# Patient Record
Sex: FEMALE | Race: WHITE | NOT HISPANIC OR LATINO | URBAN - METROPOLITAN AREA
[De-identification: names, ages, dates, MRNs, and addresses within clinical notes are randomized per-mention and may not be internally consistent; named-entity substitution may affect disease eponyms.]

---

## 2024-02-03 ASSESSMENT — DERMATOLOGY QUALITY OF LIFE (QOL) ASSESSMENT
RATE HOW EMOTIONALLY BOTHERED YOU ARE BY YOUR SKIN PROBLEM (FOR EXAMPLE, WORRY, EMBARRASSMENT, FRUSTRATION): 0 - NEVER BOTHERED
RATE HOW BOTHERED YOU ARE BY EFFECTS OF YOUR SKIN PROBLEMS ON YOUR ACTIVITIES (EG, GOING OUT, ACCOMPLISHING WHAT YOU WANT, WORK ACTIVITIES OR YOUR RELATIONSHIPS WITH OTHERS): 0 - NEVER BOTHERED
RATE HOW BOTHERED YOU ARE BY SYMPTOMS OF YOUR SKIN PROBLEM (EG, ITCHING, STINGING BURNING, HURTING OR SKIN IRRITATION): 1
RATE HOW BOTHERED YOU ARE BY SYMPTOMS OF YOUR SKIN PROBLEM (EG, ITCHING, STINGING BURNING, HURTING OR SKIN IRRITATION): 1
RATE HOW EMOTIONALLY BOTHERED YOU ARE BY YOUR SKIN PROBLEM (FOR EXAMPLE, WORRY, EMBARRASSMENT, FRUSTRATION): 0 - NEVER BOTHERED
RATE HOW BOTHERED YOU ARE BY EFFECTS OF YOUR SKIN PROBLEMS ON YOUR ACTIVITIES (EG, GOING OUT, ACCOMPLISHING WHAT YOU WANT, WORK ACTIVITIES OR YOUR RELATIONSHIPS WITH OTHERS): 0 - NEVER BOTHERED

## 2024-02-03 ASSESSMENT — PATIENT GLOBAL ASSESSMENT (PGA): WHAT IS THE PGA: PATIENT GLOBAL ASSESSMENT:  1 - CLEAR

## 2024-02-07 ENCOUNTER — OFFICE VISIT (OUTPATIENT)
Dept: DERMATOLOGY | Facility: CLINIC | Age: 74
End: 2024-02-07
Payer: MEDICARE

## 2024-02-07 DIAGNOSIS — L56.8 OTHER SPECIFIED ACUTE SKIN CHANGES DUE TO ULTRAVIOLET RADIATION: ICD-10-CM

## 2024-02-07 DIAGNOSIS — L57.0 ACTINIC KERATOSIS: ICD-10-CM

## 2024-02-07 DIAGNOSIS — Z12.83 SKIN CANCER SCREENING: ICD-10-CM

## 2024-02-07 DIAGNOSIS — D48.5 NEOPLASM OF UNCERTAIN BEHAVIOR OF SKIN: Primary | ICD-10-CM

## 2024-02-07 DIAGNOSIS — L82.1 SEBORRHEIC KERATOSIS: ICD-10-CM

## 2024-02-07 PROCEDURE — 99213 OFFICE O/P EST LOW 20 MIN: CPT | Performed by: DERMATOLOGY

## 2024-02-07 PROCEDURE — 88305 TISSUE EXAM BY PATHOLOGIST: CPT | Performed by: DERMATOLOGY

## 2024-02-07 PROCEDURE — 17000 DESTRUCT PREMALG LESION: CPT | Performed by: STUDENT IN AN ORGANIZED HEALTH CARE EDUCATION/TRAINING PROGRAM

## 2024-02-07 PROCEDURE — 11102 TANGNTL BX SKIN SINGLE LES: CPT | Performed by: STUDENT IN AN ORGANIZED HEALTH CARE EDUCATION/TRAINING PROGRAM

## 2024-02-07 PROCEDURE — 1159F MED LIST DOCD IN RCRD: CPT | Performed by: DERMATOLOGY

## 2024-02-07 PROCEDURE — 17003 DESTRUCT PREMALG LES 2-14: CPT | Performed by: STUDENT IN AN ORGANIZED HEALTH CARE EDUCATION/TRAINING PROGRAM

## 2024-02-07 ASSESSMENT — DERMATOLOGY PATIENT ASSESSMENT
ARE YOU AN ORGAN TRANSPLANT RECIPIENT: NO
HAVE YOU HAD OR DO YOU HAVE VASCULAR DISEASE: NO
DO YOU HAVE ANY NEW OR CHANGING LESIONS: NO
DO YOU USE A TANNING BED: NO
DO YOU USE SUNSCREEN: DAILY
HAVE YOU HAD OR DO YOU HAVE A STAPH INFECTION: NO
DO YOU HAVE IRREGULAR MENSTRUAL CYCLES: NO
ARE YOU TRYING TO GET PREGNANT: NO
ARE YOU ON BIRTH CONTROL: NO

## 2024-02-07 ASSESSMENT — DERMATOLOGY QUALITY OF LIFE (QOL) ASSESSMENT
ARE THERE EXCLUSIONS OR EXCEPTIONS FOR THE QUALITY OF LIFE ASSESSMENT: NO
RATE HOW EMOTIONALLY BOTHERED YOU ARE BY YOUR SKIN PROBLEM (FOR EXAMPLE, WORRY, EMBARRASSMENT, FRUSTRATION): 0 - NEVER BOTHERED
RATE HOW BOTHERED YOU ARE BY EFFECTS OF YOUR SKIN PROBLEMS ON YOUR ACTIVITIES (EG, GOING OUT, ACCOMPLISHING WHAT YOU WANT, WORK ACTIVITIES OR YOUR RELATIONSHIPS WITH OTHERS): 0 - NEVER BOTHERED
DATE THE QUALITY-OF-LIFE ASSESSMENT WAS COMPLETED: 66877
RATE HOW BOTHERED YOU ARE BY SYMPTOMS OF YOUR SKIN PROBLEM (EG, ITCHING, STINGING BURNING, HURTING OR SKIN IRRITATION): 0 - NEVER BOTHERED

## 2024-02-07 ASSESSMENT — PATIENT GLOBAL ASSESSMENT (PGA): PATIENT GLOBAL ASSESSMENT: PATIENT GLOBAL ASSESSMENT:  1 - CLEAR

## 2024-02-07 ASSESSMENT — ITCH NUMERIC RATING SCALE: HOW SEVERE IS YOUR ITCHING?: 0

## 2024-02-07 NOTE — PROGRESS NOTES
Subjective     Karli Gentile is a 73 y.o. female who presents for the following: No chief complaint on file..     She has a hx of BCC of the left cheek and left shoulder in 2018.    Review of Systems:  No other skin or systemic complaints other than what is documented elsewhere in the note.    The following portions of the chart were reviewed this encounter and updated as appropriate:          Skin Cancer History  No skin cancer on file.      Specialty Problems    None       Objective   Well appearing patient in no apparent distress; mood and affect are within normal limits.    A focused skin examination was performed. All findings within normal limits unless otherwise noted below.    Stuck on verrucous, tan-brown papules and plaques.      Diffuse photodamage with actinic changes with telangiectasia and mottled pigmentation in sun-exposed areas.     The rest of the skin exam at today's visit was notable for: scattered clinically benign-appearing: Nevi of scalp.  Nevi of neck.  Nevi on the face.  Nevi on the upper extremities.  Nevi of trunk.  Nevi on the lower extremities.   lentigines.  seborrhoeic keratoses. cherry angiomas. dermatofibromas. sebaceous hyperplasia. skin tags. Well healed scar at the site(s) of prior treatment with no evidence of recurrence.       Mid upper forehead  Erythematous macule with central scale     Left Buccal Cheek, Right Mid Helix  Erythematous macules with gritty scale.           Assessment/Plan   Neoplasm of uncertain behavior of skin  Mid upper forehead    Lesion biopsy  Type of biopsy: tangential    Informed consent: discussed and consent obtained    Timeout: patient name, date of birth, surgical site, and procedure verified    Procedure prep:  Patient was prepped and draped  Anesthesia: the lesion was anesthetized in a standard fashion    Anesthetic:  1% lidocaine w/ epinephrine 1-100,000 local infiltration  Instrument used: DermaBlade    Hemostasis achieved with: aluminum  chloride    Outcome: patient tolerated procedure well    Post-procedure details: sterile dressing applied and wound care instructions given    Dressing type: petrolatum and bandage      Specimen 1 - Dermatopathology- DERM LAB  Differential Diagnosis: r/o AK vs SCC vs BCC  Check Margins Yes/No?:    Comments:    Dermpath Lab: Routine Histopathology (formalin-fixed tissue)    Lesion concerning for AK vs BCC vs SCC.   - Previously treated with cryotherapy and adjacent areas were also treated with cryotherapy with no resolution.     The need for biopsy for definitive diagnosis recommended. Risks and benefits reviewed, see procedure note.      Actinic keratosis (2)  Right Mid Helix; Left Buccal Cheek    Actinic Keratoses - right helix, right and left lower cheek.  The pre-cancerous nature of these lesions and treatment options were discussed with the patient today.  At this time, I recommend treatment with liquid nitrogen cryotherapy.  The patient expressed understanding, is in agreement with this plan, and wishes to proceed with cryotherapy today.     Destr of lesion - Left Buccal Cheek, Right Mid Helix  Complexity: simple    Destruction method: cryotherapy    Informed consent: discussed and consent obtained    Lesion destroyed using liquid nitrogen: Yes    Cryotherapy cycles:  1  Outcome: patient tolerated procedure well with no complications    Post-procedure details: wound care instructions given      Seborrheic keratosis    Seborrheic Keratoses - the benign nature of these lesions was discussed with the patient today and reassurance provided.  No treatment is medically indicated for these lesions at this time.     Other specified acute skin changes due to ultraviolet radiation    Photodamage.  The signs and symptoms of skin cancer were reviewed and the patient was advised to practice sun protection and sun avoidance, use daily sunscreen, and perform regular self skin exams.  Sun protection was discussed, including  avoiding the mid-day sun, wearing a sunscreen with SPF at least 50, and stressing the need for reapplication of sunscreen and applying more than they think they need.     Skin cancer screening    Total body skin exam  - No lesions concerning for malignancy on today’s exam  - ABCDEs of melanoma discussed. Ugly duckling sign discussed. Monitor for any skin lesions that are different in color, shape, or size than others on the body.  - Sun protection emphasized. Recommend SPF 30+, hats with brims, sun protective clothing, and avoiding sun exposure between 10am and 2 pm whenever possible  - Recommend regular skin exams or sooner if new or changing lesions  - Return for skin check in  1 year    Related Procedures  Follow Up In Dermatology - Established Patient      My Kasey, DO  Department of Dermatology    I saw and evaluated the patient. I personally obtained the key and critical portions of the history and physical exam or was physically present for key and critical portions performed by the resident/fellow. I reviewed the resident/fellow's documentation and discussed the patient with the resident/fellow. I agree with the resident/fellow's medical decision making as documented in the note.    Bhargav Glez MD PhD

## 2024-02-09 LAB
LABORATORY COMMENT REPORT: NORMAL
PATH REPORT.FINAL DX SPEC: NORMAL
PATH REPORT.GROSS SPEC: NORMAL
PATH REPORT.MICROSCOPIC SPEC OTHER STN: NORMAL
PATH REPORT.RELEVANT HX SPEC: NORMAL
PATH REPORT.TOTAL CANCER: NORMAL

## 2024-02-12 ENCOUNTER — TELEPHONE (OUTPATIENT)
Dept: DERMATOLOGY | Facility: CLINIC | Age: 74
End: 2024-02-12
Payer: MEDICARE

## 2024-02-13 NOTE — TELEPHONE ENCOUNTER
TELEPHONE ENCOUNTER  2/12/2024  Karli Gentile  [phone]    Reason for Call:  -Patient called stating her biopsy site started to have redness and swelling around it. Denied drainage or pain. She noticed the area affected was specific to to where the adhesive of the bandage was placed. She applied fluocinonide ointment she had at home to the area today due to the itch and swelling.     Patient advised:  - Discussed with patient after reviewing the photos that this was most likely a reaction to the adhesive. Instructed to discontinue the fluocinonide, and switch to the hydrocortisone 1% cream she already has at home. Discussed she may continue to pply vaseline to the biopsy site and may use telfa and paper tape if she feels she needs to cover the area. Patient expressed understanding.     Juanita Victor, DO

## 2024-03-25 ENCOUNTER — APPOINTMENT (OUTPATIENT)
Dept: DERMATOLOGY | Facility: CLINIC | Age: 74
End: 2024-03-25
Payer: MEDICARE

## 2024-03-27 ENCOUNTER — OFFICE VISIT (OUTPATIENT)
Dept: DERMATOLOGY | Facility: CLINIC | Age: 74
End: 2024-03-27
Payer: MEDICARE

## 2024-03-27 DIAGNOSIS — L01.00 IMPETIGO: Primary | ICD-10-CM

## 2024-03-27 DIAGNOSIS — L82.1 SEBORRHEIC KERATOSIS: ICD-10-CM

## 2024-03-27 DIAGNOSIS — L57.8 DIFFUSE PHOTODAMAGE OF SKIN: ICD-10-CM

## 2024-03-27 DIAGNOSIS — L81.4 LENTIGO: ICD-10-CM

## 2024-03-27 DIAGNOSIS — L57.0 ACTINIC KERATOSIS: ICD-10-CM

## 2024-03-27 DIAGNOSIS — Z12.83 SKIN CANCER SCREENING: ICD-10-CM

## 2024-03-27 PROCEDURE — 17004 DESTROY PREMAL LESIONS 15/>: CPT | Performed by: DERMATOLOGY

## 2024-03-27 PROCEDURE — 99214 OFFICE O/P EST MOD 30 MIN: CPT | Performed by: DERMATOLOGY

## 2024-03-27 PROCEDURE — 1159F MED LIST DOCD IN RCRD: CPT | Performed by: DERMATOLOGY

## 2024-03-27 RX ORDER — MUPIROCIN 20 MG/G
OINTMENT TOPICAL
Qty: 22 G | Refills: 2 | Status: SHIPPED | OUTPATIENT
Start: 2024-03-27

## 2024-03-27 RX ORDER — LORATADINE 10 MG/1
1 TABLET ORAL AS NEEDED
COMMUNITY
Start: 2020-12-04

## 2024-03-27 RX ORDER — OLOPATADINE HYDROCHLORIDE 665 UG/1
SPRAY NASAL
COMMUNITY
Start: 2022-04-22

## 2024-03-27 RX ORDER — ACYCLOVIR 400 MG/1
TABLET ORAL
COMMUNITY
Start: 2023-11-06

## 2024-03-27 RX ORDER — DENOSUMAB 60 MG/ML
INJECTION SUBCUTANEOUS
COMMUNITY
Start: 2022-12-07

## 2024-03-27 RX ORDER — FAMOTIDINE 10 MG/1
TABLET ORAL
COMMUNITY

## 2024-03-27 RX ORDER — ACETAMINOPHEN 500 MG
TABLET ORAL
COMMUNITY

## 2024-03-27 RX ORDER — AZELASTINE 1 MG/ML
SPRAY, METERED NASAL
COMMUNITY

## 2024-03-27 NOTE — PROGRESS NOTES
Subjective     Karli Gentile is a 74 y.o. female who presents for the following: Rash (In left nostril).  The patient states she intermittently develops redness, scaling, and scabbing inside her nose every year.  She states she typically uses either Vaseline or Aquaphor ointment and the lesion heals, but her most recent flare began 2 weeks ago and has not healed.  She denies any other areas of involvement.    Of note, the patient recently saw Dr. Glez in our office on 2/7/24 for a total body skin exam, at which time biopsy of a suspicious lesion on her mid upper forehead revealed an actinic keratosis.  Today, the patient states the biopsy site healed well.  She denies any other new, changing, or concerning skin lesions since that visit; no bleeding, itching, or burning lesions.      Review of Systems:  No other skin or systemic complaints other than what is documented elsewhere in the note.    The following portions of the chart were reviewed this encounter and updated as appropriate:       Skin Cancer History  No skin cancer on file.    Specialty Problems    None      Past Dermatologic / Past Relevant Medical History:    - reported history of 2 basal cell carcinomas, including on left cheek s/p Mohs surgery by Dr. Pettit in the past and on left shoulder, according to the patient  - AKs  - no h/o melanoma    Family History:    No family history of melanoma    Social History:    The patient states she works as the  of a [employer] in [place] and lives in [place], and her  will be undergoing neurosurgery for a pinched nerve soon    Allergies:  Benzodiazepines, Fentanyl, Quinolones, Cat dander, and Weed pollen    Current Medications / CAM's:    Current Outpatient Medications:     acyclovir (Zovirax) 400 mg tablet, , Disp: , Rfl:     denosumab (Prolia) 60 mg/mL syringe, by Does not apply route., Disp: , Rfl:     loratadine (Claritin) 10 mg tablet, Take 1 tablet (10 mg)  by mouth if needed., Disp: , Rfl:     olopatadine (Patanase) 0.6 % spray,non-aerosol nasal spray, , Disp: , Rfl:     azelastine (Astelin) 137 mcg (0.1 %) nasal spray, , Disp: , Rfl:     cholecalciferol (Vitamin D-3) 5,000 Units tablet, Take by mouth., Disp: , Rfl:     famotidine (Pepcid) 10 mg tablet, Take by mouth., Disp: , Rfl:     mupirocin (Bactroban) 2 % ointment, Apply twice daily to affected area of nose for 2 weeks; repeat as needed for flares, Disp: 22 g, Rfl: 2     Objective   Well appearing patient in no apparent distress; mood and affect are within normal limits.    A skin examination was performed including the face and neck. All findings within normal limits unless otherwise noted below.    Assessment/Plan   1. Impetigo  Left Tip of Nose  On the medial aspect of her left nasal alar rim, there is an erythematous, scaly and crusted papule with a central fissure and overlying yellowish, honey colored crust    Impetigo - left nasal alar rim.  The bacterial nature of this condition and treatment options were discussed extensively with the patient in the office today.  At this time, I recommend topical antibiotic therapy with Mupirocin 2% ointment, which she was instructed to apply twice daily to the affected area of her nose for the next 2 weeks; she may repeat treatment in a similar fashion as needed for future flares.  I also recommend frequent and aggressive moisturization of the area several times a day with either Vaseline or Aquaphor ointment.  The risks, benefits, and side effects of these medications were discussed.  She expressed understanding and is in agreement with this plan.    mupirocin (Bactroban) 2 % ointment - Left Tip of Nose  Apply twice daily to affected area of nose for 2 weeks; repeat as needed for flares    2. Skin cancer screening    Related Procedures  Follow Up In Dermatology - Established Patient    3. Actinic keratosis (16)  Mid Forehead (16)  On the patient's mid upper forehead,  there is a pink, well-healed scar at the recent biopsy site with a surrounding rim of erythema, grittiness, and scale; scattered on the patient's face, including her forehead, nose, and bilateral cheeks, there are multiple erythematous, gritty, scaly macules    Biopsy-proven Actinic Keratosis and additional AKs -mid upper forehead, present on the deep and peripheral margins, and scattered on face, respectively.  The pre-cancerous nature of these lesions and treatment options were discussed with the patient today.  At this time, I recommend treatment with liquid nitrogen cryotherapy.  The patient expressed understanding, is in agreement with this plan, and wishes to proceed with cryotherapy today.    Destr of lesion - Mid Forehead  Complexity: simple    Destruction method: cryotherapy    Informed consent: discussed and consent obtained    Lesion destroyed using liquid nitrogen: Yes    Cryotherapy cycles:  1  Outcome: patient tolerated procedure well with no complications    Post-procedure details: wound care instructions given      4. Seborrheic keratosis  Scattered on the patient's face and neck, there are multiple tan- to light brown-colored, hyperkeratotic, stuck-on appearing papules of varying size and shape    Seborrheic Keratoses - the benign nature of these lesions was discussed with the patient today and reassurance provided.  No treatment is medically indicated for these lesions at this time.    5. Lentigo  Photodistributed  Multiple tan- to light brown-colored, round- to oval-shaped, symmetric and uniform-appearing macules and small patches consistent with lentigines scattered in sun-exposed areas.    Solar Lentigines and photodamage.  The clinically benign-appearing nature of these lesions and their relation to chronic sun exposure were discussed with the patient today and reassurance provided.  None of these lesions meet threshold for biopsy today, and thus no treatment is medically indicated for these  lesions at this time.  The signs and symptoms of skin cancer were reviewed and the patient was advised to practice sun protection and sun avoidance, use daily sunscreen, and perform regular self skin exams.  The patient was instructed to monitor these lesions for any changes, such as in size, shape, or color, or associated symptoms and to call our office to schedule a return visit for re-evaluation if any such changes or symptoms are noticed in the future.  The patient expressed understanding and is in agreement with this plan.    6. Diffuse photodamage of skin  Photodistributed  Diffuse photodamage with actinic changes with telangiectasia and mottled pigmentation in sun-exposed areas.    History of basal cell carcinomas and actinic keratoses and photodamage.  The patient was recently seen in our office for routine follow-up and skin exam by Dr. Glez on 2/7/24, at which time no evidence of recurrence was noted.  The signs and symptoms of skin cancer were reviewed and the patient was advised to practice sun protection and sun avoidance, use daily sunscreen, and perform regular self skin exams.  I recommend the patient return to our office in 6 to 12 months from the date of her last visit with Dr. Glez for routine follow-up and skin exam, and the patient was instructed to call our office should the patient notice any new, changing, symptomatic, or otherwise concerning skin lesions before then.  The patient expressed understanding and is in agreement with this plan.

## 2024-07-06 ENCOUNTER — HOSPITAL ENCOUNTER (EMERGENCY)
Facility: HOSPITAL | Age: 74
Discharge: HOME | End: 2024-07-07
Payer: MEDICARE

## 2024-07-06 ENCOUNTER — APPOINTMENT (OUTPATIENT)
Dept: RADIOLOGY | Facility: HOSPITAL | Age: 74
End: 2024-07-06
Payer: MEDICARE

## 2024-07-06 DIAGNOSIS — M25.572 ACUTE LEFT ANKLE PAIN: Primary | ICD-10-CM

## 2024-07-06 DIAGNOSIS — S96.912A STRAIN OF LEFT ANKLE, INITIAL ENCOUNTER: ICD-10-CM

## 2024-07-06 DIAGNOSIS — S93.402A SPRAIN OF LEFT ANKLE, INITIAL ENCOUNTER: ICD-10-CM

## 2024-07-06 PROCEDURE — 73610 X-RAY EXAM OF ANKLE: CPT | Mod: LEFT SIDE | Performed by: RADIOLOGY

## 2024-07-06 PROCEDURE — 73610 X-RAY EXAM OF ANKLE: CPT | Mod: LT

## 2024-07-06 PROCEDURE — 99283 EMERGENCY DEPT VISIT LOW MDM: CPT

## 2024-07-06 ASSESSMENT — COLUMBIA-SUICIDE SEVERITY RATING SCALE - C-SSRS
6. HAVE YOU EVER DONE ANYTHING, STARTED TO DO ANYTHING, OR PREPARED TO DO ANYTHING TO END YOUR LIFE?: NO
1. IN THE PAST MONTH, HAVE YOU WISHED YOU WERE DEAD OR WISHED YOU COULD GO TO SLEEP AND NOT WAKE UP?: NO
2. HAVE YOU ACTUALLY HAD ANY THOUGHTS OF KILLING YOURSELF?: NO

## 2024-07-06 ASSESSMENT — PAIN DESCRIPTION - ORIENTATION: ORIENTATION: LEFT

## 2024-07-06 ASSESSMENT — PAIN - FUNCTIONAL ASSESSMENT: PAIN_FUNCTIONAL_ASSESSMENT: 0-10

## 2024-07-06 ASSESSMENT — PAIN DESCRIPTION - LOCATION: LOCATION: ANKLE

## 2024-07-06 ASSESSMENT — PAIN DESCRIPTION - PAIN TYPE: TYPE: ACUTE PAIN

## 2024-07-06 ASSESSMENT — PAIN SCALES - GENERAL: PAINLEVEL_OUTOF10: 7

## 2024-07-07 VITALS
OXYGEN SATURATION: 98 % | BODY MASS INDEX: 21.49 KG/M2 | SYSTOLIC BLOOD PRESSURE: 136 MMHG | HEART RATE: 66 BPM | TEMPERATURE: 97.7 F | RESPIRATION RATE: 17 BRPM | HEIGHT: 65 IN | WEIGHT: 129 LBS | DIASTOLIC BLOOD PRESSURE: 78 MMHG

## 2024-07-07 NOTE — ED PROVIDER NOTES
Chief Complaint   Patient presents with    Ankle Pain     left     HPI:   Karli Gentile is an 74 y.o. female with a history of HTN presenting to the ED for chief complaint of left ankle pain.  She explains that her pain started today when she was at [employer].  She explains that she was walking up the hill to use the bathroom when she came back down to her seat she had increasingly worsening pain in the left ankle.  She denies twisting or falling.  She explains that she did momentarily lose her balance when she was walking on the heel but did not fall down.  States that she used 2 ibuprofen tablets about an hour ago which have slightly decreased her pain.  Her pain is worse with weightbearing and walking.  Slightly alleviated with rest.  Denies numbness or tingling.  Denies other injury.    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    Chondromalacia of patella    Essential (primary) hypertension 09/10/2014    Benign essential HTN    Pain in unspecified knee 11/25/2014    Knee pain    Personal history of diseases of the skin and subcutaneous tissue 11/24/2015    History of impetigo    Personal history of other infectious and parasitic diseases 07/19/2016    History of hepatitis    Urinary tract infection, site not specified 01/24/2014    Acute urinary tract infection     Past Surgical History:   Procedure Laterality Date    COLONOSCOPY  02/12/2016    Complete Colonoscopy    DILATION AND CURETTAGE OF UTERUS  02/12/2016    Dilation And Curettage Of Cervical Stump    TONSILLECTOMY  02/12/2016    Tonsillectomy With Adenoidectomy     No family history on file.     Physical Exam  Vitals and nursing note reviewed.   Constitutional:        General: She is not in acute distress.     Appearance: She is well-developed.   HENT:      Head: Normocephalic and atraumatic.   Eyes:      Conjunctiva/sclera: Conjunctivae normal.   Cardiovascular:      Rate and Rhythm: Normal rate and regular rhythm.      Heart sounds: No murmur heard.  Pulmonary:      Effort: Pulmonary effort is normal. No respiratory distress.      Breath sounds: Normal breath sounds.   Abdominal:      Palpations: Abdomen is soft.      Tenderness: There is no abdominal tenderness.   Musculoskeletal:         General: No swelling.      Cervical back: Neck supple.      Comments: Exam of the left ankle shows no obvious bruising, deformity or swelling.  She is directly tender over the ATFL.  She is also directly tender over the peroneal tendon.  No other areas of tenderness.  She does have full range of motion of the ankle.  Increased pain with plantarflexion and lateral movement.  Open drawer is equivocal   Skin:     General: Skin is warm and dry.      Capillary Refill: Capillary refill takes less than 2 seconds. Good distal pulses  Neurological:      General: No focal deficit present.      Mental Status: She is alert.      Sensory: No sensory deficit.      Comments: Neurovascularly intact   Psychiatric:         Mood and Affect: Mood normal.        VS: As documented in the triage note and EMR flowsheet from this visit were reviewed.    Medical Decision Making: This is a 74-year-old female presenting with left ankle pain that started today after she lost her balance at a concert.  Her pain is over the lateral aspect of the ankle.  She denies hearing a pop or having a fall.  On exam the patient's vitals are stable she is speaking full sentences in no acute distress.  There is no obvious deformity or swelling of the ankle.  She is directly tender over the ATFL.  Negative open drawer.  Suspicion for possible low-grade sprain of the ATFL.  She is also directly tender over the peroneal tendon.  Concern  for strain of the tendon.  She has full strength so low suspicion for complete tear.  She is neurovascularly intact good distal pulses compartments of the leg are soft.  X-ray of the ankle showed no acute abnormality.  Patient was reassured by these results today.  Discussed with her using a walking boot in which she declines at this time.  Patient was placed in an Aircast and given crutches for weightbearing as tolerated.  Recommended follow-up with orthopedics outpatient.  Patient is adamant that she does not want to use the walking boot as it throws off the rest of her body.  Recommended RICE protocol.  She is appropriate for outpatient management at this time.  Differential diagnosis includes sprain, strain, fracture, dislocation    Prescription Drug Consideration: Did consider anti-inflammatories however patient did medicate herself with these before arrival.      Counseling: Spoke with the patient and discussed today´s findings, in addition to providing specific details for the plan of care and expected course.  Patient was given the opportunity to ask questions.    Discussed return precautions and importance of follow-up.  Advised to follow-up with Orthopedics.  I specifically advised to return to the ED for changing or worsening symptoms, new symptoms, complaint specific precautions, and precautions listed on the discharge paperwork.  Educated on the common potential side effects of medications prescribed.    I advised the patient that the emergency evaluation and treatment provided today doesn't end their need for medical care. It is very important that they follow-up with their primary care provider or other specialist as instructed.    The plan of care was mutually agreed upon with the patient. The patient and/or family were given the opportunity to ask questions. All questions asked today in the ED were answered to the best of my ability with today's information.    This patient was cared for in the  setting of nationwide stress on resources and staffing.    This report was transcribed using voice recognition software.  Every effort was made to ensure accuracy, however, inadvertently computerized transcription errors may be present.       Moody Gaming PA-C  07/06/24 3002

## 2024-07-07 NOTE — DISCHARGE INSTRUCTIONS
Weightbearing as tolerated with the crutches.  Please use the Aircast with ambulation.  You can use 800 mg of ibuprofen every 6-8 hours.  Ice and elevate the leg is much as he can tolerate.  Use pain as your guide.  Follow-up with orthopedics  
Clear

## 2024-07-07 NOTE — ED TRIAGE NOTES
Pt c/o acute onset of left ankle pain while at [employer]. Does not recall twisting her ankle, states when she was on the hill walking she momentarily lost her balance, denies falling. Thinks this stumble may have lead to the ankle pain. States she took 2 ibuprofen tablets about an hour ago. States pain much worse when standing on it and walking. No obvious swelling and no obvious deformity.

## 2024-07-15 ENCOUNTER — APPOINTMENT (OUTPATIENT)
Dept: ORTHOPEDIC SURGERY | Facility: CLINIC | Age: 74
End: 2024-07-15
Payer: MEDICARE

## 2024-07-15 VITALS — HEIGHT: 65 IN | WEIGHT: 129 LBS | BODY MASS INDEX: 21.49 KG/M2

## 2024-07-15 DIAGNOSIS — S93.402A SPRAIN OF LEFT ANKLE, INITIAL ENCOUNTER: ICD-10-CM

## 2024-07-15 PROCEDURE — 99203 OFFICE O/P NEW LOW 30 MIN: CPT | Performed by: ORTHOPAEDIC SURGERY

## 2024-07-15 PROCEDURE — 1159F MED LIST DOCD IN RCRD: CPT | Performed by: ORTHOPAEDIC SURGERY

## 2024-07-15 PROCEDURE — 3008F BODY MASS INDEX DOCD: CPT | Performed by: ORTHOPAEDIC SURGERY

## 2024-07-15 ASSESSMENT — PAIN SCALES - GENERAL: PAINLEVEL_OUTOF10: 0 - NO PAIN

## 2024-07-15 ASSESSMENT — PAIN - FUNCTIONAL ASSESSMENT: PAIN_FUNCTIONAL_ASSESSMENT: NO/DENIES PAIN

## 2024-07-15 NOTE — PROGRESS NOTES
Spontaneous swelling left ankle may have been triggered by something at [employer] is since resolved she is curious as to what happened x-rays are negative  No history of gout  Also periodically gets swelling in the back of the left knee  Baker's cyst    Past medical,family and social histories have been reviewed and are up to date.  All other body systems have been reviewed and are negative for complaint.    Awake alert oriented appropriate  Left foot and ankle: No mass or deformity and no specific localized tenderness Achilles tendon intact ankle and subtalar mobility intact  Impression plantar arthritis now resolved further investigation of symptoms recur

## 2024-09-03 DIAGNOSIS — Z12.11 SPECIAL SCREENING FOR MALIGNANT NEOPLASMS, COLON: ICD-10-CM

## 2024-09-03 RX ORDER — SOD SULF/POT CHLORIDE/MAG SULF 1.479 G
TABLET ORAL
Qty: 24 TABLET | Refills: 0 | Status: SHIPPED | OUTPATIENT
Start: 2024-09-03

## 2024-10-22 ENCOUNTER — APPOINTMENT (OUTPATIENT)
Dept: DERMATOLOGY | Facility: CLINIC | Age: 74
End: 2024-10-22
Payer: MEDICARE

## 2024-10-28 ENCOUNTER — APPOINTMENT (OUTPATIENT)
Dept: DERMATOLOGY | Facility: CLINIC | Age: 74
End: 2024-10-28
Payer: MEDICARE

## 2024-10-28 DIAGNOSIS — L82.1 STUCCO KERATOSIS: ICD-10-CM

## 2024-10-28 DIAGNOSIS — L57.0 ACTINIC KERATOSIS: Primary | ICD-10-CM

## 2024-10-28 PROCEDURE — 99213 OFFICE O/P EST LOW 20 MIN: CPT | Performed by: STUDENT IN AN ORGANIZED HEALTH CARE EDUCATION/TRAINING PROGRAM

## 2024-10-28 PROCEDURE — 17000 DESTRUCT PREMALG LESION: CPT | Performed by: STUDENT IN AN ORGANIZED HEALTH CARE EDUCATION/TRAINING PROGRAM

## 2024-10-28 PROCEDURE — 17003 DESTRUCT PREMALG LES 2-14: CPT | Performed by: STUDENT IN AN ORGANIZED HEALTH CARE EDUCATION/TRAINING PROGRAM

## 2024-10-28 ASSESSMENT — DERMATOLOGY QUALITY OF LIFE (QOL) ASSESSMENT
RATE HOW EMOTIONALLY BOTHERED YOU ARE BY YOUR SKIN PROBLEM (FOR EXAMPLE, WORRY, EMBARRASSMENT, FRUSTRATION): 0 - NEVER BOTHERED
RATE HOW BOTHERED YOU ARE BY SYMPTOMS OF YOUR SKIN PROBLEM (EG, ITCHING, STINGING BURNING, HURTING OR SKIN IRRITATION): 0 - NEVER BOTHERED
DATE THE QUALITY-OF-LIFE ASSESSMENT WAS COMPLETED: 67141
RATE HOW BOTHERED YOU ARE BY EFFECTS OF YOUR SKIN PROBLEMS ON YOUR ACTIVITIES (EG, GOING OUT, ACCOMPLISHING WHAT YOU WANT, WORK ACTIVITIES OR YOUR RELATIONSHIPS WITH OTHERS): 0 - NEVER BOTHERED
ARE THERE EXCLUSIONS OR EXCEPTIONS FOR THE QUALITY OF LIFE ASSESSMENT: NO

## 2024-10-28 ASSESSMENT — ITCH NUMERIC RATING SCALE: HOW SEVERE IS YOUR ITCHING?: 0

## 2024-10-28 ASSESSMENT — PATIENT GLOBAL ASSESSMENT (PGA): PATIENT GLOBAL ASSESSMENT: PATIENT GLOBAL ASSESSMENT:  1 - CLEAR

## 2024-10-28 ASSESSMENT — DERMATOLOGY PATIENT ASSESSMENT: DO YOU HAVE ANY NEW OR CHANGING LESIONS: NO

## 2024-10-28 NOTE — PROGRESS NOTES
Subjective     Karli Gentile is a 74 y.o. female who presents for the following: Suspicious Skin Lesion (Rt Cheek, gets red intermittently.).     Review of Systems:  No other skin or systemic complaints other than what is documented elsewhere in the note.    The following portions of the chart were reviewed this encounter and updated as appropriate:          Skin Cancer History  No skin cancer on file.      Specialty Problems    None       Objective   Well appearing patient in no apparent distress; mood and affect are within normal limits.    A focused skin examination was performed. All findings within normal limits unless otherwise noted below.    Assessment/Plan   1. Actinic keratosis (2)  Mid Frontal Scalp, Right Zygomatic Area  Erythematous macules with gritty scale.    Destr of lesion - Mid Frontal Scalp, Right Zygomatic Area  Complexity: simple    Destruction method: cryotherapy    Informed consent: discussed and consent obtained    Lesion destroyed using liquid nitrogen: Yes    Region frozen until ice ball extended beyond lesion: Yes    Cryotherapy cycles:  1  Outcome: patient tolerated procedure well with no complications    Post-procedure details: wound care instructions given      2. Stucco keratosis (3)  Left Dorsal Hand (2); Right Dorsal Hand    Agree with intermittent use of triamcinolone cream if needed for flaring

## 2024-10-29 ENCOUNTER — TELEPHONE (OUTPATIENT)
Dept: DERMATOLOGY | Facility: CLINIC | Age: 74
End: 2024-10-29
Payer: MEDICARE

## 2024-11-06 ENCOUNTER — TELEPHONE (OUTPATIENT)
Dept: DERMATOLOGY | Facility: CLINIC | Age: 74
End: 2024-11-06
Payer: MEDICARE

## 2024-11-06 NOTE — TELEPHONE ENCOUNTER
"Pt reached out through email stating, \"I suspect this might be contact dematitis. Seems worse after I’ve been perspiring while power walking. Any suggestions on how to manage this condition? Should I be concerned?\"        Dr. Martinez was also sent this email.      "

## 2024-11-15 ENCOUNTER — TELEPHONE (OUTPATIENT)
Dept: DERMATOLOGY | Facility: CLINIC | Age: 74
End: 2024-11-15
Payer: MEDICARE

## 2024-11-15 NOTE — TELEPHONE ENCOUNTER
Pt called stating that she sent picture in of her itchy body. Dr. Martinez wanted her to be seen because he could not tell what was going on through the picture. She asked to be added to our waitlist. I sent message on 11/7/2024 to  to added.     Pt is leaving to go out of town for a month on 12/15/2024. She asked if she could be seen with any provider before then as she is concerned to travel with her issue.     Will call patient back after I hear from PAR.

## 2024-11-21 ENCOUNTER — APPOINTMENT (OUTPATIENT)
Dept: GASTROENTEROLOGY | Facility: EXTERNAL LOCATION | Age: 74
End: 2024-11-21
Payer: MEDICARE

## 2025-01-16 ENCOUNTER — APPOINTMENT (OUTPATIENT)
Dept: GASTROENTEROLOGY | Facility: EXTERNAL LOCATION | Age: 75
End: 2025-01-16
Payer: MEDICARE

## 2025-02-10 ENCOUNTER — APPOINTMENT (OUTPATIENT)
Dept: DERMATOLOGY | Facility: CLINIC | Age: 75
End: 2025-02-10
Payer: MEDICARE

## 2025-02-13 ENCOUNTER — TELEPHONE (OUTPATIENT)
Dept: DERMATOLOGY | Facility: CLINIC | Age: 75
End: 2025-02-13
Payer: MEDICARE

## 2025-02-20 ENCOUNTER — APPOINTMENT (OUTPATIENT)
Dept: GASTROENTEROLOGY | Facility: EXTERNAL LOCATION | Age: 75
End: 2025-02-20
Payer: MEDICARE

## 2025-02-20 DIAGNOSIS — K21.9 GASTROESOPHAGEAL REFLUX DISEASE, UNSPECIFIED WHETHER ESOPHAGITIS PRESENT: ICD-10-CM

## 2025-02-20 DIAGNOSIS — K29.00 ACUTE SUPERFICIAL GASTRITIS WITHOUT HEMORRHAGE: ICD-10-CM

## 2025-02-20 DIAGNOSIS — Z86.0100 HISTORY OF COLONIC POLYPS: ICD-10-CM

## 2025-02-20 DIAGNOSIS — K21.9 GASTROESOPHAGEAL REFLUX DISEASE WITHOUT ESOPHAGITIS: ICD-10-CM

## 2025-02-20 DIAGNOSIS — Z12.11 SCREENING FOR COLON CANCER: Primary | ICD-10-CM

## 2025-02-20 PROCEDURE — 88305 TISSUE EXAM BY PATHOLOGIST: CPT

## 2025-02-20 PROCEDURE — 43239 EGD BIOPSY SINGLE/MULTIPLE: CPT | Performed by: INTERNAL MEDICINE

## 2025-02-20 PROCEDURE — 88305 TISSUE EXAM BY PATHOLOGIST: CPT | Performed by: PATHOLOGY

## 2025-02-20 PROCEDURE — G0105 COLORECTAL SCRN; HI RISK IND: HCPCS | Performed by: INTERNAL MEDICINE

## 2025-02-20 RX ORDER — OMEPRAZOLE 20 MG/1
20 CAPSULE, DELAYED RELEASE ORAL
Qty: 90 CAPSULE | Refills: 2 | Status: SHIPPED | OUTPATIENT
Start: 2025-02-20 | End: 2026-02-20

## 2025-02-20 NOTE — PROGRESS NOTES
Patient is here for surveillance colonoscopy showing no polyps and normal terminal ileum.  She has had increasing bloating and there is no signs of colitis.  Due to her acid reflux and epigastric discomfort endoscopy is done which shows gastritis.  I am recommending Prilosec 20 mg/day for 2 months.  I will call with the biopsies when available.  Repeat colonoscopy in 5 years

## 2025-02-21 ENCOUNTER — LAB REQUISITION (OUTPATIENT)
Dept: LAB | Facility: HOSPITAL | Age: 75
End: 2025-02-21
Payer: MEDICARE

## 2025-02-24 LAB
LABORATORY COMMENT REPORT: NORMAL
PATH REPORT.FINAL DX SPEC: NORMAL
PATH REPORT.GROSS SPEC: NORMAL
PATH REPORT.RELEVANT HX SPEC: NORMAL
PATH REPORT.TOTAL CANCER: NORMAL

## 2025-03-28 ENCOUNTER — APPOINTMENT (OUTPATIENT)
Dept: DERMATOLOGY | Facility: CLINIC | Age: 75
End: 2025-03-28
Payer: MEDICARE

## 2025-04-17 ENCOUNTER — HOSPITAL ENCOUNTER (EMERGENCY)
Facility: HOSPITAL | Age: 75
Discharge: HOME | End: 2025-04-18
Payer: MEDICARE

## 2025-04-17 VITALS
WEIGHT: 130.07 LBS | DIASTOLIC BLOOD PRESSURE: 73 MMHG | RESPIRATION RATE: 17 BRPM | TEMPERATURE: 98.2 F | BODY MASS INDEX: 21.67 KG/M2 | OXYGEN SATURATION: 97 % | HEART RATE: 76 BPM | SYSTOLIC BLOOD PRESSURE: 135 MMHG | HEIGHT: 65 IN
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
## 2025-04-17 DIAGNOSIS — S61.213A LACERATION OF LEFT MIDDLE FINGER WITHOUT FOREIGN BODY WITHOUT DAMAGE TO NAIL, INITIAL ENCOUNTER: Primary | ICD-10-CM

## 2025-04-17 PROBLEM — L65.9 ALOPECIA: Status: ACTIVE | Noted: 2018-05-22

## 2025-04-17 PROBLEM — M26.609 TMJ DYSFUNCTION: Status: ACTIVE | Noted: 2018-03-21

## 2025-04-17 PROBLEM — I44.7 LEFT BUNDLE BRANCH BLOCK: Status: ACTIVE | Noted: 2024-09-30

## 2025-04-17 PROBLEM — M81.0 OSTEOPOROSIS: Status: ACTIVE | Noted: 2018-05-22

## 2025-04-17 PROBLEM — R87.619 ATYPICAL GLANDULAR CELLS ON CERVICAL PAP SMEAR: Status: ACTIVE | Noted: 2019-09-17

## 2025-04-17 PROBLEM — C44.319 BASAL CELL CARCINOMA OF SKIN OF OTHER PARTS OF FACE: Status: ACTIVE | Noted: 2023-06-20

## 2025-04-17 PROBLEM — D22.5 MELANOCYTIC NEVI OF TRUNK: Status: ACTIVE | Noted: 2020-06-30

## 2025-04-17 PROBLEM — H02.889 MEIBOMIAN GLAND DYSFUNCTION (MGD): Status: ACTIVE | Noted: 2024-01-03

## 2025-04-17 PROBLEM — H90.3 ASYMMETRIC SNHL (SENSORINEURAL HEARING LOSS): Status: ACTIVE | Noted: 2025-04-17

## 2025-04-17 PROBLEM — E78.2 COMBINED HYPERLIPIDEMIA: Status: ACTIVE | Noted: 2025-04-17

## 2025-04-17 PROBLEM — N87.0 CERVICAL INTRAEPITHELIAL NEOPLASIA GRADE 1: Status: ACTIVE | Noted: 2020-09-30

## 2025-04-17 PROBLEM — H52.209 ASTIGMATISM: Status: ACTIVE | Noted: 2024-01-03

## 2025-04-17 PROBLEM — M76.62 ACHILLES TENDINITIS OF LEFT LOWER EXTREMITY: Status: ACTIVE | Noted: 2025-04-17

## 2025-04-17 PROBLEM — H93.8X2 FULLNESS IN EAR, LEFT: Status: ACTIVE | Noted: 2025-04-17

## 2025-04-17 PROBLEM — G89.29 CHRONIC HEEL PAIN, LEFT: Status: ACTIVE | Noted: 2025-04-17

## 2025-04-17 PROBLEM — B00.9 HERPES SIMPLEX: Status: ACTIVE | Noted: 2018-05-22

## 2025-04-17 PROBLEM — H31.092 PERIPHERAL CHORIORETINAL SCARS OF LEFT EYE: Status: ACTIVE | Noted: 2024-01-03

## 2025-04-17 PROBLEM — R29.898: Status: ACTIVE | Noted: 2025-04-17

## 2025-04-17 PROBLEM — M67.472 GANGLION, LEFT ANKLE AND FOOT: Status: ACTIVE | Noted: 2018-05-25

## 2025-04-17 PROBLEM — N95.2 ATROPHIC VAGINITIS: Status: ACTIVE | Noted: 2018-08-15

## 2025-04-17 PROBLEM — E03.9 HYPOACTIVE THYROID: Status: ACTIVE | Noted: 2025-04-17

## 2025-04-17 PROBLEM — K21.9 ACID REFLUX: Status: ACTIVE | Noted: 2025-04-17

## 2025-04-17 PROBLEM — N60.02 SOLITARY CYST OF LEFT BREAST: Status: ACTIVE | Noted: 2017-10-25

## 2025-04-17 PROBLEM — E53.8 VITAMIN B12 DEFICIENCY: Status: ACTIVE | Noted: 2024-10-08

## 2025-04-17 PROBLEM — D12.6 SERRATED ADENOMA OF COLON: Status: ACTIVE | Noted: 2018-05-22

## 2025-04-17 PROBLEM — L84 CORNS AND CALLOSITIES: Status: ACTIVE | Noted: 2018-05-25

## 2025-04-17 PROBLEM — N94.10 DYSPAREUNIA, FEMALE: Status: ACTIVE | Noted: 2025-04-17

## 2025-04-17 PROBLEM — D22.62 MELANOCYTIC NEVI OF LEFT UPPER LIMB, INCLUDING SHOULDER: Status: ACTIVE | Noted: 2018-11-28

## 2025-04-17 PROBLEM — H25.019 CORTICAL SENILE CATARACT: Status: ACTIVE | Noted: 2024-01-03

## 2025-04-17 PROBLEM — M79.672 CHRONIC HEEL PAIN, LEFT: Status: ACTIVE | Noted: 2025-04-17

## 2025-04-17 PROBLEM — H52.4 PRESBYOPIA: Status: ACTIVE | Noted: 2024-01-03

## 2025-04-17 PROBLEM — H81.02 MENIERE'S DISEASE OF LEFT EAR: Status: ACTIVE | Noted: 2018-05-22

## 2025-04-17 PROBLEM — M99.05 SEGMENTAL AND SOMATIC DYSFUNCTION OF PELVIC REGION: Status: ACTIVE | Noted: 2025-04-17

## 2025-04-17 PROBLEM — C44.619 BASAL CELL CARCINOMA OF SKIN OF LEFT UPPER LIMB, INCLUDING SHOULDER: Status: ACTIVE | Noted: 2023-06-20

## 2025-04-17 PROBLEM — R53.83 FATIGUE: Status: ACTIVE | Noted: 2025-04-17

## 2025-04-17 PROBLEM — R92.8 MAMMOGRAM ABNORMAL: Status: ACTIVE | Noted: 2025-04-17

## 2025-04-17 PROBLEM — H35.372 EPIRETINAL MEMBRANE, LEFT EYE: Status: ACTIVE | Noted: 2024-01-03

## 2025-04-17 PROBLEM — H93.A2 PULSATILE TINNITUS OF LEFT EAR: Status: ACTIVE | Noted: 2025-04-17

## 2025-04-17 PROCEDURE — 99283 EMERGENCY DEPT VISIT LOW MDM: CPT

## 2025-04-17 PROCEDURE — 12001 RPR S/N/AX/GEN/TRNK 2.5CM/<: CPT | Performed by: PHYSICIAN ASSISTANT

## 2025-04-17 PROCEDURE — 2500000004 HC RX 250 GENERAL PHARMACY W/ HCPCS (ALT 636 FOR OP/ED): Performed by: PHYSICIAN ASSISTANT

## 2025-04-17 RX ORDER — LIDOCAINE HYDROCHLORIDE 10 MG/ML
10 INJECTION, SOLUTION INFILTRATION; PERINEURAL ONCE
Status: COMPLETED | OUTPATIENT
Start: 2025-04-17 | End: 2025-04-17

## 2025-04-17 RX ADMIN — LIDOCAINE HYDROCHLORIDE 10 ML: 10 INJECTION, SOLUTION INFILTRATION; PERINEURAL at 23:58

## 2025-04-17 ASSESSMENT — PAIN SCALES - GENERAL: PAINLEVEL_OUTOF10: 2

## 2025-04-17 ASSESSMENT — PAIN - FUNCTIONAL ASSESSMENT: PAIN_FUNCTIONAL_ASSESSMENT: 0-10

## 2025-04-17 ASSESSMENT — PAIN DESCRIPTION - PAIN TYPE: TYPE: ACUTE PAIN

## 2025-04-17 ASSESSMENT — PAIN DESCRIPTION - LOCATION: LOCATION: FINGER (COMMENT WHICH ONE)

## 2025-04-17 ASSESSMENT — PAIN DESCRIPTION - DESCRIPTORS: DESCRIPTORS: THROBBING

## 2025-04-18 NOTE — ED TRIAGE NOTES
Pt to ER with laceration on left middle finger after cutting squash for dinner. Unsure when last tetanus shot was

## 2025-04-18 NOTE — ED PROVIDER NOTES
Chief Complaint   Patient presents with    Laceration     finger     HPI:   Karli Gentile is a right-hand-dominant 75 y.o. female with history of GERD, basal cell carcinoma, HSV who presents to the ED for evaluation of left middle finger laceration.  Patient says she was using a brand-new knife to cut squash for dinner and accidentally cut her left middle finger with the knife.  She did not clean it before coming to the ED because she said it was bleeding too much.  She denies history of bleeding clotting disorder.  Denies anticoagulant use.  She is unsure of last tetanus.  Denies any numbness, tingling, extremity weakness.    Physical Exam  Vitals and nursing note reviewed.   Constitutional:       General: She is not in acute distress.     Appearance: Normal appearance. She is not ill-appearing or toxic-appearing.   Eyes:      Pupils: Pupils are equal, round, and reactive to light.   Cardiovascular:      Rate and Rhythm: Normal rate and regular rhythm.      Pulses: Normal pulses.      Heart sounds: Normal heart sounds.   Pulmonary:      Effort: Pulmonary effort is normal. No respiratory distress.      Breath sounds: Normal breath sounds.   Musculoskeletal:         General: Signs of injury present. No deformity. Normal range of motion.      Comments: Left hand: Roughly 1 cm curvilinear laceration on the radial aspect of third digit near DIP.  No communication with nail.  Normal cap refill.  2-point sensation intact.  Can still actively flex and extend third MCP, PIP and DIP although DIP movement does reproduce pain.  Can make okay sign.  Can do rock paper scissors.  2+ radial pulse.   Skin:     General: Skin is warm and dry.      Capillary Refill: Capillary refill takes less than 2 seconds.   Neurological:      Mental Status: She is alert.      Cranial Nerves: No cranial nerve deficit.      Sensory: No sensory deficit.     VS: As documented in the triage note and EMR flowsheet from this visit were  reviewed.    External Records Reviewed: I reviewed recent and relevant outside records including: Chart review I am unable to find last tetanus.      Medical Decision Making:   ED Course as of 04/17/25 2359   Thu Apr 17, 2025 2157 Vitals Reviewed: Afebrile. Normotensive. Not tachycardic nor tachypneic. No hypoxia.   [KA]   2248 Patient is a 75-year-old female who presents to the ED for evaluation of left middle finger laceration.  On exam,  Roughly 1 cm curvilinear laceration on the radial aspect of third digit near DIP.  No communication with nail.  Normal cap refill.  2-point sensation intact.  Can still actively flex and extend third MCP, PIP and DIP although DIP movement does reproduce pain.  Can make okay sign.  Can do rock paper scissors.  2+ radial pulse.  Will necessitate laceration repair.  I have asked nursing staff to soak the wound.  Will then irrigate and suture.  Patient has declined tetanus.  Does not want any medication for pain. [KA]   2358 Patient tolerated laceration repair well.  She was able to verbalize understanding wound care instructions using teach back method.  Recommended follow-up with PCP for suture removal and return to ED for any new or worsening symptoms.  She is agreeable to plan. [KA]      ED Course User Index  [KA] Martina Valle PA-C         Diagnoses as of 04/17/25 2359   Laceration of left middle finger without foreign body without damage to nail, initial encounter   Injury due to knife     Laceration Repair    Performed by: Martina Valle PA-C  Authorized by: Martina Valle PA-C    Consent:     Consent obtained:  Verbal    Consent given by:  Patient    Risks discussed:  Infection, pain, poor cosmetic result and poor wound healing  Universal protocol:     Immediately prior to procedure, a time out was called: yes      Patient identity confirmed:  Verbally with patient  Anesthesia:     Anesthesia method:  Local infiltration    Local anesthetic:  Lidocaine 1% w/o  epi  Laceration details:     Location:  Finger    Finger location:  L long finger    Length (cm):  1 (Curvilinear)  Pre-procedure details:     Preparation:  Patient was prepped and draped in usual sterile fashion  Exploration:     Imaging outcome: foreign body not noted      Wound exploration: entire depth of wound visualized      Contaminated: no    Treatment:     Area cleansed with:  Soap and water, saline and povidone-iodine    Irrigation solution:  Sterile saline    Irrigation volume:  40    Irrigation method:  Pressure wash  Skin repair:     Repair method:  Sutures    Suture size:  5-0    Suture material:  Prolene    Suture technique:  Simple interrupted    Number of sutures:  3  Approximation:     Approximation:  Close  Repair type:     Repair type:  Simple  Post-procedure details:     Dressing:  Sterile dressing, adhesive bandage and splint for protection    Procedure completion:  Tolerated well, no immediate complications     Escalation of Care: Appropriate for outpatient management     Counseling: Spoke with the patient and discussed today´s findings, in addition to providing specific details for the plan of care and expected course.  Patient was given the opportunity to ask questions.    Discussed return precautions and importance of follow-up.  Advised to follow-up with PCP and/or Ortho.  Advised to return to the ED for changing or worsening symptoms, new symptoms, complaint specific precautions, and precautions listed on the discharge paperwork.  Educated on the common potential side effects of medications prescribed.    I advised the patient that the emergency evaluation and treatment provided today doesn't end their need for medical care. It is very important that they follow-up with their primary care provider or other specialist as instructed.    The plan of care was mutually agreed upon with the patient. The patient and/or family were given the opportunity to ask questions. All questions asked today  in the ED were answered to the best of my ability with today's information.    I specifically advised the patient to return to the ED for changing or worsening symptoms, worrisome new symptoms, or for any complaint specific precautions listed on the discharge paperwork.    This patient was cared for in the setting of nationwide stress on resources and staffing.    This report was transcribed using voice recognition software.  Every effort was made to ensure accuracy, however, inadvertently computerized transcription errors may be present.       Martina Valle PA-C  04/17/25 [id number]

## 2025-04-18 NOTE — DISCHARGE INSTRUCTIONS
Please keep wound clean dry and covered for the next 24 hours.  After 24 hours may remove bandage.  Wash with gentle soap and water.  Pat dry.  If you would like to continue using the splint for comfort and to prevent wound from breaking open when your finger bends, may reapply.  Do not get splint wet.  Do not submerge hand in water until after sutures are removed in 10 to 14 days.  Sometimes stitches tend to itch while they are healing.  If this occurs, may use a Q-tip to apply topical antibiotic ointment along the edge of the wound 1-2 times per day.  This can aid in wound healing, stop itching and prevent infection.  Follow-up with primary care provider to have sutures removed in 10 to 14 days.  Return to ER for any new or worsening signs such as fever, red lines moving up the arm, purulent drainage, worsening pain or anything else concerning to.

## 2025-04-22 ENCOUNTER — APPOINTMENT (OUTPATIENT)
Dept: DERMATOLOGY | Facility: CLINIC | Age: 75
End: 2025-04-22
Payer: MEDICARE

## 2025-04-22 DIAGNOSIS — L82.0 INFLAMED SEBORRHEIC KERATOSIS: ICD-10-CM

## 2025-04-22 DIAGNOSIS — D22.9 MULTIPLE BENIGN NEVI: ICD-10-CM

## 2025-04-22 DIAGNOSIS — L57.0 ACTINIC KERATOSIS: Primary | ICD-10-CM

## 2025-04-22 DIAGNOSIS — L30.4 INTERTRIGO: ICD-10-CM

## 2025-04-22 PROCEDURE — 17000 DESTRUCT PREMALG LESION: CPT | Performed by: STUDENT IN AN ORGANIZED HEALTH CARE EDUCATION/TRAINING PROGRAM

## 2025-04-22 PROCEDURE — 1159F MED LIST DOCD IN RCRD: CPT | Performed by: STUDENT IN AN ORGANIZED HEALTH CARE EDUCATION/TRAINING PROGRAM

## 2025-04-22 PROCEDURE — 17003 DESTRUCT PREMALG LES 2-14: CPT | Performed by: STUDENT IN AN ORGANIZED HEALTH CARE EDUCATION/TRAINING PROGRAM

## 2025-04-22 PROCEDURE — 99214 OFFICE O/P EST MOD 30 MIN: CPT | Performed by: STUDENT IN AN ORGANIZED HEALTH CARE EDUCATION/TRAINING PROGRAM

## 2025-04-22 RX ORDER — KETOCONAZOLE 20 MG/G
CREAM TOPICAL
Qty: 60 G | Refills: 11 | Status: SHIPPED | OUTPATIENT
Start: 2025-04-22

## 2025-04-22 RX ORDER — HYDROCORTISONE 25 MG/G
OINTMENT TOPICAL
Qty: 20 G | Refills: 3 | Status: SHIPPED | OUTPATIENT
Start: 2025-04-22

## 2025-04-22 ASSESSMENT — DERMATOLOGY PATIENT ASSESSMENT
DO YOU USE SUNSCREEN: DAILY
HAVE YOU HAD OR DO YOU HAVE A STAPH INFECTION: NO
ARE YOU TRYING TO GET PREGNANT: NO
HAVE YOU HAD OR DO YOU HAVE VASCULAR DISEASE: NO
ARE YOU ON BIRTH CONTROL: NO
DO YOU USE A TANNING BED: NO
ARE YOU AN ORGAN TRANSPLANT RECIPIENT: NO
DO YOU HAVE IRREGULAR MENSTRUAL CYCLES: NO
DO YOU HAVE ANY NEW OR CHANGING LESIONS: NO

## 2025-04-22 ASSESSMENT — PATIENT GLOBAL ASSESSMENT (PGA): PATIENT GLOBAL ASSESSMENT: PATIENT GLOBAL ASSESSMENT:  1 - CLEAR

## 2025-04-22 ASSESSMENT — DERMATOLOGY QUALITY OF LIFE (QOL) ASSESSMENT
WHAT SINGLE SKIN CONDITION LISTED BELOW IS THE PATIENT ANSWERING THE QUALITY-OF-LIFE ASSESSMENT QUESTIONS ABOUT: NONE OF THE ABOVE
RATE HOW BOTHERED YOU ARE BY SYMPTOMS OF YOUR SKIN PROBLEM (EG, ITCHING, STINGING BURNING, HURTING OR SKIN IRRITATION): 0 - NEVER BOTHERED
DATE THE QUALITY-OF-LIFE ASSESSMENT WAS COMPLETED: 67317
RATE HOW EMOTIONALLY BOTHERED YOU ARE BY YOUR SKIN PROBLEM (FOR EXAMPLE, WORRY, EMBARRASSMENT, FRUSTRATION): 0 - NEVER BOTHERED
ARE THERE EXCLUSIONS OR EXCEPTIONS FOR THE QUALITY OF LIFE ASSESSMENT: NO
RATE HOW BOTHERED YOU ARE BY EFFECTS OF YOUR SKIN PROBLEMS ON YOUR ACTIVITIES (EG, GOING OUT, ACCOMPLISHING WHAT YOU WANT, WORK ACTIVITIES OR YOUR RELATIONSHIPS WITH OTHERS): 0 - NEVER BOTHERED

## 2025-04-22 ASSESSMENT — ITCH NUMERIC RATING SCALE: HOW SEVERE IS YOUR ITCHING?: 0

## 2025-04-22 NOTE — Clinical Note
Exam findings: Benign appearing macules and papules  Plan: monitor for any new or changing nevi. Notify me should this occur.  Over the counter use of sun screen product (30+ SPF with mineral sun screen) recommended

## 2025-04-22 NOTE — PROGRESS NOTES
Subjective     Karli Gentile is a 75 y.o. female who presents for the following: Skin Check (FBSE, area on nose where her glasses sit, and spots on forehead. HX of BCC).     Review of Systems:  No other skin or systemic complaints other than what is documented elsewhere in the note.    The following portions of the chart were reviewed this encounter and updated as appropriate:         Skin Cancer History  Biopsy Log Book  No skin cancers from Specimen Tracking.    Additional History      Specialty Problems          Dermatology Problems    Alopecia    Corns and callosities    Melanocytic nevi of left upper limb, including shoulder    Melanocytic nevi of trunk    Basal cell carcinoma of skin of left upper limb, including shoulder    Basal cell carcinoma of skin of other parts of face        Objective   Well appearing patient in no apparent distress; mood and affect are within normal limits.    A full examination was performed including scalp, head, eyes, ears, nose, lips, neck, chest, axillae, abdomen, back, buttocks, bilateral upper extremities, bilateral lower extremities, hands, feet, fingers, toes, fingernails, and toenails. All findings within normal limits unless otherwise noted below.    Assessment/Plan   Skin Exam  1. ACTINIC KERATOSIS (3)  Generalized, Mid Parietal Scalp, Right Ear  Erythematous macules with gritty scale.  Destr of lesion - Generalized, Mid Parietal Scalp, Right Ear  Complexity: simple    Destruction method: cryotherapy    Informed consent: discussed and consent obtained    Lesion destroyed using liquid nitrogen: Yes    Region frozen until ice ball extended beyond lesion: Yes    Cryotherapy cycles:  1  Outcome: patient tolerated procedure well with no complications    Post-procedure details: wound care instructions given    2. MULTIPLE BENIGN NEVI  Generalized  Exam findings: Benign appearing macules and papules  Plan: monitor for any new or changing nevi. Notify me should this  occur.  Over the counter use of sun screen product (30+ SPF with mineral sun screen) recommended    3. INTERTRIGO  Left Inframammary Fold, Right Inframammary Fold  Clear today  Intermittent redness and burning  Related Medications  ketoconazole (NIZOral) 2 % cream  Use daily under breast in the warm month and when you are less active just once a week  4. INFLAMED SEBORRHEIC KERATOSIS  Right Nasal Sidewall  Itchy bothersome reddish verrucous papules, very subtle  hydrocortisone 2.5 % ointment - Right Nasal Sidewall  Apply a tiny drop once weekly to nasal root if needed for itch or burning

## 2025-04-22 NOTE — ADDENDUM NOTE
Addended by: NANCIE TOSCANO on: 4/22/2025 09:58 AM     Modules accepted: Orders, Level of Service

## 2025-04-25 ENCOUNTER — APPOINTMENT (OUTPATIENT)
Dept: DERMATOLOGY | Facility: CLINIC | Age: 75
End: 2025-04-25
Payer: MEDICARE

## 2026-04-24 ENCOUNTER — APPOINTMENT (OUTPATIENT)
Dept: DERMATOLOGY | Facility: CLINIC | Age: 76
End: 2026-04-24
Payer: MEDICARE